# Patient Record
Sex: FEMALE | Race: WHITE | NOT HISPANIC OR LATINO | Employment: FULL TIME | URBAN - METROPOLITAN AREA
[De-identification: names, ages, dates, MRNs, and addresses within clinical notes are randomized per-mention and may not be internally consistent; named-entity substitution may affect disease eponyms.]

---

## 2019-10-14 LAB
PAP RECOMMENDATION EXT: NORMAL
PAP SMEAR: NORMAL

## 2022-04-11 ENCOUNTER — HISTORICAL (OUTPATIENT)
Dept: ADMINISTRATIVE | Facility: HOSPITAL | Age: 37
End: 2022-04-11

## 2022-04-28 VITALS
DIASTOLIC BLOOD PRESSURE: 72 MMHG | BODY MASS INDEX: 28.36 KG/M2 | OXYGEN SATURATION: 99 % | SYSTOLIC BLOOD PRESSURE: 114 MMHG | WEIGHT: 170.19 LBS | HEIGHT: 65 IN

## 2022-12-28 ENCOUNTER — DOCUMENTATION ONLY (OUTPATIENT)
Dept: ADMINISTRATIVE | Facility: HOSPITAL | Age: 37
End: 2022-12-28

## 2023-04-30 PROBLEM — Z23 IMMUNIZATION DUE: Status: ACTIVE | Noted: 2023-04-30

## 2023-04-30 PROBLEM — F41.9 ANXIETY: Status: ACTIVE | Noted: 2023-04-30

## 2023-04-30 PROBLEM — Z00.00 ENCOUNTER FOR WELLNESS EXAMINATION IN ADULT: Status: ACTIVE | Noted: 2023-04-30

## 2023-04-30 PROBLEM — Z87.891 FORMER SMOKER: Status: ACTIVE | Noted: 2023-04-30

## 2023-04-30 PROBLEM — R51.9 FREQUENT HEADACHES: Status: ACTIVE | Noted: 2023-04-30

## 2023-07-31 PROBLEM — Z00.00 ENCOUNTER FOR WELLNESS EXAMINATION IN ADULT: Status: RESOLVED | Noted: 2023-04-30 | Resolved: 2023-07-31

## 2024-03-04 ENCOUNTER — HOSPITAL ENCOUNTER (EMERGENCY)
Facility: HOSPITAL | Age: 39
Discharge: HOME | End: 2024-03-04
Attending: EMERGENCY MEDICINE
Payer: COMMERCIAL

## 2024-03-04 VITALS
SYSTOLIC BLOOD PRESSURE: 109 MMHG | HEIGHT: 65 IN | RESPIRATION RATE: 20 BRPM | TEMPERATURE: 97 F | BODY MASS INDEX: 31.65 KG/M2 | HEART RATE: 67 BPM | DIASTOLIC BLOOD PRESSURE: 60 MMHG | WEIGHT: 190 LBS | OXYGEN SATURATION: 98 %

## 2024-03-04 DIAGNOSIS — F41.9 ANXIETY: Primary | ICD-10-CM

## 2024-03-04 DIAGNOSIS — R06.02 SOB (SHORTNESS OF BREATH): ICD-10-CM

## 2024-03-04 LAB
ALBUMIN SERPL-MCNC: 4 G/DL (ref 3.5–5)
ALBUMIN/GLOB SERPL: 1.2 RATIO (ref 1.1–2)
ALP SERPL-CCNC: 58 UNIT/L (ref 40–150)
ALT SERPL-CCNC: 13 UNIT/L (ref 0–55)
AST SERPL-CCNC: 16 UNIT/L (ref 5–34)
BASOPHILS # BLD AUTO: 0.01 X10(3)/MCL
BASOPHILS NFR BLD AUTO: 0.1 %
BILIRUB SERPL-MCNC: 0.8 MG/DL
BNP BLD-MCNC: <10 PG/ML
BUN SERPL-MCNC: 14.9 MG/DL (ref 7–18.7)
CALCIUM SERPL-MCNC: 9.4 MG/DL (ref 8.4–10.2)
CHLORIDE SERPL-SCNC: 106 MMOL/L (ref 98–107)
CO2 SERPL-SCNC: 23 MMOL/L (ref 22–29)
CREAT SERPL-MCNC: 0.83 MG/DL (ref 0.55–1.02)
EOSINOPHIL # BLD AUTO: 0.13 X10(3)/MCL (ref 0–0.9)
EOSINOPHIL NFR BLD AUTO: 1.4 %
ERYTHROCYTE [DISTWIDTH] IN BLOOD BY AUTOMATED COUNT: 12.5 % (ref 11.5–17)
GFR SERPLBLD CREATININE-BSD FMLA CKD-EPI: >60 MLS/MIN/1.73/M2
GLOBULIN SER-MCNC: 3.4 GM/DL (ref 2.4–3.5)
GLUCOSE SERPL-MCNC: 93 MG/DL (ref 74–100)
HCT VFR BLD AUTO: 49.9 % (ref 37–47)
HGB BLD-MCNC: 16.6 G/DL (ref 12–16)
IMM GRANULOCYTES # BLD AUTO: 0.02 X10(3)/MCL (ref 0–0.04)
IMM GRANULOCYTES NFR BLD AUTO: 0.2 %
LYMPHOCYTES # BLD AUTO: 2.26 X10(3)/MCL (ref 0.6–4.6)
LYMPHOCYTES NFR BLD AUTO: 23.7 %
MCH RBC QN AUTO: 31.1 PG (ref 27–31)
MCHC RBC AUTO-ENTMCNC: 33.3 G/DL (ref 33–36)
MCV RBC AUTO: 93.6 FL (ref 80–94)
MONOCYTES # BLD AUTO: 0.66 X10(3)/MCL (ref 0.1–1.3)
MONOCYTES NFR BLD AUTO: 6.9 %
NEUTROPHILS # BLD AUTO: 6.46 X10(3)/MCL (ref 2.1–9.2)
NEUTROPHILS NFR BLD AUTO: 67.7 %
PLATELET # BLD AUTO: 266 X10(3)/MCL (ref 130–400)
PMV BLD AUTO: 10.9 FL (ref 7.4–10.4)
POTASSIUM SERPL-SCNC: 3.9 MMOL/L (ref 3.5–5.1)
PROT SERPL-MCNC: 7.4 GM/DL (ref 6.4–8.3)
RBC # BLD AUTO: 5.33 X10(6)/MCL (ref 4.2–5.4)
SODIUM SERPL-SCNC: 138 MMOL/L (ref 136–145)
WBC # SPEC AUTO: 9.54 X10(3)/MCL (ref 4.5–11.5)

## 2024-03-04 PROCEDURE — 83880 ASSAY OF NATRIURETIC PEPTIDE: CPT

## 2024-03-04 PROCEDURE — 85025 COMPLETE CBC W/AUTO DIFF WBC: CPT

## 2024-03-04 PROCEDURE — 93005 ELECTROCARDIOGRAM TRACING: CPT

## 2024-03-04 PROCEDURE — 93010 ELECTROCARDIOGRAM REPORT: CPT | Mod: ,,, | Performed by: INTERNAL MEDICINE

## 2024-03-04 PROCEDURE — 99285 EMERGENCY DEPT VISIT HI MDM: CPT | Mod: 25

## 2024-03-04 PROCEDURE — 80053 COMPREHEN METABOLIC PANEL: CPT

## 2024-03-04 RX ORDER — HYDROXYZINE PAMOATE 25 MG/1
50 CAPSULE ORAL
Qty: 15 CAPSULE | Refills: 0 | Status: SHIPPED | OUTPATIENT
Start: 2024-03-04 | End: 2024-05-24

## 2024-03-04 NOTE — ED PROVIDER NOTES
Encounter Date: 3/4/2024       History     Chief Complaint   Patient presents with    Arm Pain     Pt reports of right arm pain and numbness - states she gets SOB at time. Last night had trouble breathing.      Melinda, 38 year old female presents to the Er for right arm pain, tingling and numbness to bilateral hands with swelling, shortness of breath, awakening from sleep.     The history is provided by the patient. No  was used.     Review of patient's allergies indicates:   Allergen Reactions    Penicillins      Past Medical History:   Diagnosis Date    Anxiety disorder, unspecified     Frequent headaches      Past Surgical History:   Procedure Laterality Date    BILATERAL TUBAL LIGATION  2010     SECTION  2010    TONSILLECTOMY  1995     Family History   Problem Relation Age of Onset    Sudden death Mother 35        MVA    No Known Problems Father     No Known Problems Brother     Alcohol abuse Paternal Grandfather     Coronary artery disease Paternal Grandfather     Diabetes Mellitus Paternal Grandfather         Type ii    Breast cancer Other     Colon cancer Other     Lung cancer Other      Social History     Tobacco Use    Smoking status: Former     Current packs/day: 0.00     Average packs/day: 0.5 packs/day for 15.0 years (7.5 ttl pk-yrs)     Types: Cigarettes     Start date:      Quit date:      Years since quittin.2    Smokeless tobacco: Never   Substance Use Topics    Alcohol use: Yes     Alcohol/week: 12.0 standard drinks of alcohol     Types: 12 Cans of beer per week     Comment: Socially    Drug use: Never     Review of Systems   Constitutional: Negative.    HENT: Negative.     Eyes: Negative.    Respiratory:  Positive for shortness of breath.    Cardiovascular: Negative.    Gastrointestinal: Negative.    Endocrine: Negative.    Genitourinary: Negative.    Musculoskeletal:  Positive for joint swelling and myalgias.   Skin: Negative.     Allergic/Immunologic: Negative.    Neurological:  Positive for weakness and numbness.   Hematological: Negative.    Psychiatric/Behavioral: Negative.     All other systems reviewed and are negative.      Physical Exam     Initial Vitals [03/04/24 1354]   BP Pulse Resp Temp SpO2   109/60 67 20 97.3 °F (36.3 °C) 98 %      MAP       --         Physical Exam    Nursing note and vitals reviewed.  Constitutional: She appears well-developed and well-nourished.   HENT:   Head: Normocephalic and atraumatic.   Right Ear: External ear normal.   Left Ear: External ear normal.   Nose: Nose normal.   Mouth/Throat: Oropharynx is clear and moist.   Eyes: Conjunctivae and EOM are normal. Pupils are equal, round, and reactive to light.   Neck: Neck supple.   Normal range of motion.  Cardiovascular:  Normal rate, regular rhythm, normal heart sounds and intact distal pulses.           Pulmonary/Chest: Breath sounds normal.   Abdominal: Abdomen is soft. Bowel sounds are normal.   Musculoskeletal:         General: Tenderness and edema present.      Cervical back: Normal range of motion and neck supple.     Neurological: She is alert and oriented to person, place, and time. She has normal reflexes. GCS score is 15. GCS eye subscore is 4. GCS verbal subscore is 5. GCS motor subscore is 6.   Decreased strength to the left hand/arm secondary to pain.  Swelling noted to the left hand/wrist.     Skin: Skin is warm and dry. Capillary refill takes less than 2 seconds.         ED Course   Procedures  Labs Reviewed   CBC WITH DIFFERENTIAL - Abnormal; Notable for the following components:       Result Value    Hgb 16.6 (*)     Hct 49.9 (*)     MCH 31.1 (*)     MPV 10.9 (*)     All other components within normal limits   B-TYPE NATRIURETIC PEPTIDE - Normal   CBC W/ AUTO DIFFERENTIAL    Narrative:     The following orders were created for panel order CBC auto differential.  Procedure                               Abnormality         Status                      ---------                               -----------         ------                     CBC with Differential[[id number]]       Abnormal            Final result                 Please view results for these tests on the individual orders.   COMPREHENSIVE METABOLIC PANEL     EKG Readings: (Independently Interpreted)   Initial Reading: No STEMI. Rhythm: Normal Sinus Rhythm. Heart Rate: 62. Ectopy: No Ectopy. Conduction: Normal. Clinical Impression: Normal Sinus Rhythm Other Impression: Possible left atrial enlargement     ECG Results              EKG 12-lead (Final result)        Collection Time Result Time QRS Duration [facility] QTC Calculation    03/04/24 13:59:05 03/05/24 17:08:28 88 403                     Final result by Interface, Lab In [facility] (03/05/24 17:08:32)                   Narrative:    Test Reason : R06.02,    Vent. Rate : 062 BPM     Atrial Rate : 062 BPM     P-R Int : 170 ms          QRS Dur : 088 ms      QT Int : 398 ms       P-R-T Axes : 045 046 057 degrees     QTc Int : 403 ms    Normal sinus rhythm  Possible Left atrial enlargement  Borderline Abnormal ECG  No previous ECGs available  Confirmed by Akash Briones MD ([id number]) on 3/5/2024 5:08:25 PM    Referred By: [email or web address]   SELF           Confirmed By:Akash Briones MD                                     EKG 12-LEAD (Final result)  Result time 03/08/24 10:06:11      Final result by Unknown User (03/08/24 10:06:11)                                      Imaging Results              X-Ray Chest PA And Lateral (Final result)  Result time 03/04/24 16:32:29      Final result by Mason Almeida MD (03/04/24 16:32:29)                   Impression:      No acute pulmonary process appreciated.      Electronically signed by: Mason Almeida  Date:    03/04/2024  Time:    16:32               Narrative:    EXAMINATION:  XR CHEST PA AND LATERAL    CLINICAL HISTORY:  shortness of breath;    TECHNIQUE:  PA and lateral radiographs of the  chest    COMPARISON:  None    FINDINGS:  Normal cardiac silhouette. The lungs are well-inflated. No consolidation identified. No pleural effusion or discernible pneumothorax.                                       Medications - No data to display  Medical Decision Making  Medical Decision Making    Diff. Dx:  Gout, RA, Osteo     Amount and/or Complexity of Data Reviewed  Labs: ordered.     Details: Chem WNL  CBC: H/H:  16.9/49.9  BNP:  <10  Radiology: ordered.     Details: CXR:  No acute pulmonary process appreciated  ECG/medicine tests: ordered.     Details: QRS Duration 88 ms  OHS QTC Calculation 403 m        Risk  Prescription drug management.               ED Course as of 03/14/24 1149   Thu Mar 14, 2024   1148 EKG 12-LEAD [RM]   1148 EKG 12-lead [RM]      ED Course User Index  [RM] Layne Hernandez NP                           Clinical Impression:  Final diagnoses:  [R06.02] SOB (shortness of breath)  [F41.9] Anxiety (Primary)          ED Disposition Condition    Discharge Stable          ED Prescriptions       Medication Sig Dispense Start Date End Date Auth. Provider    hydrOXYzine pamoate (VISTARIL) 25 MG Cap Take 2 capsules (50 mg total) by mouth as needed. 15 capsule 3/4/2024 -- Layne Hernandez NP          Follow-up Information       Follow up With Specialties Details Why Contact Info    Camden Diaz MD Family Medicine  As needed [address] [place] [id number]  [phone]               Layne Hernandez NP  03/04/24 1750       Layne Hernandez NP  03/04/24 2020       Layne Hernandez NP  03/14/24 1142

## 2024-03-04 NOTE — DISCHARGE INSTRUCTIONS
Patient will be discharged home.  Instructed to follow up with the primary care provider for further evaluation.  Take medication as needed at night.  Return ER for any worsening symptoms

## 2024-03-05 LAB
OHS QRS DURATION: 88 MS
OHS QTC CALCULATION: 403 MS

## 2024-07-01 PROBLEM — M25.561 CHRONIC PAIN OF RIGHT KNEE: Status: ACTIVE | Noted: 2024-07-01

## 2024-07-01 PROBLEM — G89.29 CHRONIC PAIN OF RIGHT KNEE: Status: ACTIVE | Noted: 2024-07-01

## 2024-08-26 PROBLEM — Z00.00 WELLNESS EXAMINATION: Status: RESOLVED | Noted: 2023-04-30 | Resolved: 2024-08-26

## 2025-01-09 ENCOUNTER — OFFICE VISIT (OUTPATIENT)
Dept: URGENT CARE | Facility: CLINIC | Age: 40
End: 2025-01-09
Payer: COMMERCIAL

## 2025-01-09 VITALS
HEART RATE: 88 BPM | TEMPERATURE: 99 F | RESPIRATION RATE: 18 BRPM | OXYGEN SATURATION: 100 % | DIASTOLIC BLOOD PRESSURE: 78 MMHG | WEIGHT: 185 LBS | HEIGHT: 65 IN | SYSTOLIC BLOOD PRESSURE: 120 MMHG | BODY MASS INDEX: 30.82 KG/M2

## 2025-01-09 DIAGNOSIS — R05.9 COUGH, UNSPECIFIED TYPE: ICD-10-CM

## 2025-01-09 DIAGNOSIS — J11.1 FLU: Primary | ICD-10-CM

## 2025-01-09 LAB
CTP QC/QA: YES
POC MOLECULAR INFLUENZA A AGN: POSITIVE
POC MOLECULAR INFLUENZA B AGN: NEGATIVE

## 2025-01-09 PROCEDURE — 99213 OFFICE O/P EST LOW 20 MIN: CPT | Mod: ,,, | Performed by: FAMILY MEDICINE

## 2025-01-09 RX ORDER — OSELTAMIVIR PHOSPHATE 75 MG/1
75 CAPSULE ORAL 2 TIMES DAILY
Qty: 10 CAPSULE | Refills: 0 | Status: SHIPPED | OUTPATIENT
Start: 2025-01-09 | End: 2025-01-14

## 2025-01-09 NOTE — LETTER
January 9, 2025      [facility] [place] [address] [address]  [facility] [address]  Phone: [phone]       Patient: Melinda Mahan   YOB: 1985  Date of Visit: 01/09/2025    To Whom It May Concern:    ABEL Mahan  was at [facility] on 01/09/2025. The patient may return to work/school on 1/13/2025 with no restrictions. If you have any questions or concerns, or if I can be of further assistance, please do not hesitate to contact me.    Sincerely,    Meena Nogueira NP

## 2025-01-09 NOTE — PROGRESS NOTES
"Subjective:      Patient ID: Melinda Mahan is a 39 y.o. female.    Vitals:  height is 5' 5" (1.651 m) and weight is 83.9 kg (185 lb). Her oral temperature is 98.5 °F (36.9 °C). Her blood pressure is 120/78 and her pulse is 88. Her respiration is 18 and oxygen saturation is 100%.     Chief Complaint: Cough    Patient is a 39 y.o. female who presents to urgent care with complaints of cough with SOB, congestion, fever (101), nausea x started yesterday. Patient denies rash, vomiting, diarrhea, sore throat, chest pain.      Cough  Associated symptoms include a fever and shortness of breath. Pertinent negatives include no chills, ear pain, postnasal drip, sore throat or wheezing.     Constitution: Positive for fever. Negative for chills, sweating and fatigue.   HENT:  Positive for congestion. Negative for ear pain, ear discharge, postnasal drip, sinus pain, sinus pressure, sore throat, trouble swallowing and voice change.    Neck: neck negative.   Cardiovascular: Negative.    Eyes: Negative.    Respiratory:  Positive for cough and shortness of breath. Negative for chest tightness, sputum production, COPD, stridor, wheezing and asthma.    Gastrointestinal:  Positive for nausea. Negative for abdominal pain, vomiting, constipation and diarrhea.   Endocrine: negative.   Genitourinary: Negative.    Musculoskeletal: Negative.    Skin: Negative.    Allergic/Immunologic: Negative for asthma.   Neurological:  Negative for disorientation and altered mental status.   Hematologic/Lymphatic: Negative.    Psychiatric/Behavioral:  Negative for altered mental status, disorientation and confusion.       Objective:     Physical Exam   Constitutional: She is oriented to person, place, and time. She appears well-developed. She is cooperative.  Non-toxic appearance. She does not appear ill. No distress.   HENT:   Head: Normocephalic and atraumatic.   Ears:   Right Ear: Hearing, tympanic membrane, external ear and ear canal normal. "   Left Ear: Hearing, tympanic membrane, external ear and ear canal normal.   Nose: Congestion present. No mucosal edema, rhinorrhea or nasal deformity. No epistaxis. Right sinus exhibits no maxillary sinus tenderness and no frontal sinus tenderness. Left sinus exhibits no maxillary sinus tenderness and no frontal sinus tenderness.   Mouth/Throat: Uvula is midline, oropharynx is clear and moist and mucous membranes are normal. No trismus in the jaw. Normal dentition. No uvula swelling. No oropharyngeal exudate, posterior oropharyngeal edema or posterior oropharyngeal erythema.   Eyes: Conjunctivae and lids are normal. No scleral icterus.   Neck: Trachea normal and phonation normal. Neck supple. No edema present. No erythema present. No neck rigidity present.   Cardiovascular: Normal rate, regular rhythm, normal heart sounds and normal pulses.   Pulmonary/Chest: Effort normal and breath sounds normal. No respiratory distress. She has no decreased breath sounds. She has no wheezes. She has no rhonchi. She has no rales.   Abdominal: Normal appearance.   Musculoskeletal: Normal range of motion.         General: No deformity. Normal range of motion.   Neurological: She is alert and oriented to person, place, and time. She exhibits normal muscle tone. Coordination normal.   Skin: Skin is warm, dry, intact, not diaphoretic and not pale.   Psychiatric: Her speech is normal and behavior is normal. Judgment and thought content normal.   Nursing note and vitals reviewed.         Previous History      Review of patient's allergies indicates:   Allergen Reactions    Penicillins        Past Medical History:   Diagnosis Date    Anxiety disorder, unspecified     Frequent headaches      Current Outpatient Medications   Medication Instructions    oseltamivir (TAMIFLU) 75 mg, Oral, 2 times daily     Past Surgical History:   Procedure Laterality Date    BILATERAL TUBAL LIGATION  2010     SECTION  2010     "TONSILLECTOMY  12/18/1995     Family History   Problem Relation Name Age of Onset    Sudden death Mother  35        MVA    No Known Problems Father      No Known Problems Brother      Alcohol abuse Paternal Grandfather      Coronary artery disease Paternal Grandfather      Diabetes Mellitus Paternal Grandfather          Type ii    Breast cancer Other      Colon cancer Other      Lung cancer Other         Social History     Tobacco Use    Smoking status: Some Days     Types: Vaping with nicotine    Smokeless tobacco: Never   Substance Use Topics    Alcohol use: Yes     Alcohol/week: 12.0 standard drinks of alcohol     Types: 12 Cans of beer per week     Comment: Socially    Drug use: Never        Physical Exam      Vital Signs Reviewed   /78   Pulse 88   Temp 98.5 °F (36.9 °C) (Oral)   Resp 18   Ht 5' 5" (1.651 m)   Wt 83.9 kg (185 lb)   LMP 01/02/2025 (Approximate)   SpO2 100%   BMI 30.79 kg/m²        Procedures    Procedures     Labs     Results for orders placed or performed in visit on 01/09/25   POCT Influenza A/B MOLECULAR    Collection Time: 01/09/25 10:31 AM   Result Value Ref Range    POC Molecular Influenza A Ag Positive (A) Negative    POC Molecular Influenza B Ag Negative Negative     Acceptable Yes       Assessment:     1. Flu    2. Cough, unspecified type        Plan:   Influenza  is positive  Medication sent to pharmacy take as prescribed  Begin over-the-counter Tylenol and Motrin as needed  May use over-the-counter elderberry, which may help boost your immune system  Over-the-counter Flonase as needed for congestion  Increase fluids intake to prevent dehydration.   Get plenty of rest. May use saline nose spray and humidifer at bedtime. Warm saltwater gargles for sore throat. Warm water with honey to help coat the throat. Throat lozenges. Chloraseptic spray for worsening sore throat. Do not smoke or allow others to smoke around you. Practice good hand hygiene to include " frequent hand washing to lessen the likelihood of transmission. Return or seek immediate medical attention for any new or worsening symptoms such as trouble breathing, continued high fever, neck stiffness, rash, or if you do not get better as expected.       Flu    Cough, unspecified type  -     POCT Influenza A/B MOLECULAR    Other orders  -     oseltamivir (TAMIFLU) 75 MG capsule; Take 1 capsule (75 mg total) by mouth 2 (two) times daily. for 5 days  Dispense: 10 capsule; Refill: 0             Additional MDM:     Heart Failure Score:   COPD = No

## 2025-01-09 NOTE — PATIENT INSTRUCTIONS
Influenza  is positive  Medication sent to pharmacy take as prescribed  Begin over-the-counter Tylenol and Motrin as needed  May use over-the-counter elderberry, which may help boost your immune system  Over-the-counter Flonase as needed for congestion  Increase fluids intake to prevent dehydration.   Get plenty of rest. May use saline nose spray and humidifer at bedtime. Warm saltwater gargles for sore throat. Warm water with honey to help coat the throat. Throat lozenges. Chloraseptic spray for worsening sore throat. Do not smoke or allow others to smoke around you. Practice good hand hygiene to include frequent hand washing to lessen the likelihood of transmission. Return or seek immediate medical attention for any new or worsening symptoms such as trouble breathing, continued high fever, neck stiffness, rash, or if you do not get better as expected.